# Patient Record
Sex: MALE | Race: WHITE
[De-identification: names, ages, dates, MRNs, and addresses within clinical notes are randomized per-mention and may not be internally consistent; named-entity substitution may affect disease eponyms.]

---

## 2019-07-30 ENCOUNTER — HOSPITAL ENCOUNTER (OUTPATIENT)
Dept: HOSPITAL 61 - KCIC | Age: 59
Discharge: HOME | End: 2019-07-30
Attending: NURSE PRACTITIONER
Payer: COMMERCIAL

## 2019-07-30 DIAGNOSIS — M25.421: Primary | ICD-10-CM

## 2019-07-30 PROCEDURE — 73080 X-RAY EXAM OF ELBOW: CPT

## 2019-07-30 NOTE — [facility]
EXAM: Right elbow, 3 views.

 

HISTORY: Pain. Swelling.

 

COMPARISON: None.

 

FINDINGS: 3 views of the right elbow are obtained. There is no fracture, 

dislocation or subluxation. There is a corticated ossicle adjacent to the 

lateral epicondyles, likely due to the sequela of remote injury. There is 

a joint effusion.

 

IMPRESSION: Right elbow effusion. No displaced fracture is seen 

radiographically.

 

Electronically signed by: Marivel Sarmiento MD (7/30/2019 2:37 PM) [place] [id number]

## 2019-08-28 ENCOUNTER — HOSPITAL ENCOUNTER (OUTPATIENT)
Dept: HOSPITAL 61 - NM | Age: 59
Discharge: HOME | End: 2019-08-28
Attending: INTERNAL MEDICINE
Payer: COMMERCIAL

## 2019-08-28 DIAGNOSIS — I71.2: ICD-10-CM

## 2019-08-28 DIAGNOSIS — I35.0: Primary | ICD-10-CM

## 2019-08-28 DIAGNOSIS — J42: ICD-10-CM

## 2019-08-28 DIAGNOSIS — I10: ICD-10-CM

## 2019-08-28 DIAGNOSIS — E78.00: ICD-10-CM

## 2019-08-28 PROCEDURE — 93306 TTE W/DOPPLER COMPLETE: CPT

## 2019-08-28 PROCEDURE — 71275 CT ANGIOGRAPHY CHEST: CPT

## 2019-08-28 PROCEDURE — 78452 HT MUSCLE IMAGE SPECT MULT: CPT

## 2019-08-28 PROCEDURE — 93017 CV STRESS TEST TRACING ONLY: CPT

## 2019-08-28 PROCEDURE — A9500 TC99M SESTAMIBI: HCPCS

## 2019-08-28 NOTE — CARD
MR#: [id number]

Account#: [id number]

Accession#: [id number]

Date of Study: 08/28/2019

Ordering Physician: OBEY ELLIS, 

Referring Physician: OBEY ELLIS 

Tech: Staci Nixon RDCS





--------------- APPROVED REPORT --------------





EXAM: Two-dimensional and M-mode echocardiogram with Doppler and color Doppler.



INDICATION

Thoracic Aortic Aneurysm



2D DIMENSIONS 

RVDd2.4 (2.9-3.5cm)Left Atrium(2D)2.8 (1.6-4.0cm)

IVSd0.7 (0.7-1.1cm)Aortic Root(2D)2.8 (2.0-3.7cm)

LVDd5.2 (3.9-5.9cm)LVOT Diameter2.0 (1.8-2.4cm)

PWd0.8 (0.7-1.1cm)LVDs3.4 (2.5-4.0cm)

FS (%) 33.9 %SV80.1 ml

LVEF(%)62.4 (>50%)



Aortic Valve

AoV Peak Jagdish.125.0cm/sAoV VTI23.5cm

AO Peak GR.6.3mmHgLVOT Peak Jagdish.132.3cm/s

AO Mean GR.3mmHgAVA (VMAX)3.18cm2

YANN   (VTI)3.60cm2



Mitral Valve

MV E Uzicfuqr19.3cm/sMV DECEL [id number]

MV A Rhxmlkvq40.7cm/sE/A  Ratio1.5



Pulmonary Vein

S1 Xeuohmak72.8cm/sD2 Iceqfowz31.4cm/s



 LEFT VENTRICLE 

The left ventricle is normal size. There is normal left ventricular wall thickness. The left ventricu
lar systolic function is normal and the ejection fraction is within normal range. The Ejection Fracti
on is 60-65%. There is normal LV segmental wall motion. The left ventricular diastolic function and f
illing is normal for age.



 RIGHT VENTRICLE 

The right ventricle is normal size. The right ventricular systolic function is normal.



 ATRIA 

The left atrium size is normal. The right atrium size is normal. The interatrial septum is intact wit
h no evidence for an atrial septal defect or patent foramen ovale as noted on 2-D or Doppler imaging.




 AORTIC VALVE 

The aortic valve is calcified but opens well. Doppler and Color Flow revealed no significant aortic r
egurgitation. There is no significant aortic valvular stenosis.



 MITRAL VALVE 

The mitral valve is normal in structure and function. There is no evidence of mitral valve prolapse. 
There is no mitral valve stenosis. Doppler and Color Flow revealed no mitral valve regurgitation note
d.



 TRICUSPID VALVE 

The tricuspid valve is normal in structure and function. Doppler and Color Flow revealed no tricuspid
 valve regurgitation noted. There is no tricuspid valve stenosis.



 PULMONIC VALVE 

The pulmonic valve is not well visualized. Doppler and Color Flow revealed no pulmonic valvular regur
gitation. There is no pulmonic valvular stenosis.



 GREAT VESSELS 

The aortic root is normal in size. The ascending aorta is mildly dilated at 4.1 cm although not well 
visualized. The IVC is normal in size and collapses >50% with inspiration.



 PERICARDIAL EFFUSION 

There is no evidence of significant pericardial effusion.



Critical Notification

Critical Value: No



<Conclusion>

The left ventricular systolic function is normal and the ejection fraction is within normal range. Th
e Ejection Fraction is 60-65%.

There is normal LV segmental wall motion.

The ascending aorta is mildly dilated at 4.1 cm although not well visualized.



Signed by : Vasiliy Toro, 

Electronically Approved : 08/28/2019 11:04:33

## 2019-08-28 NOTE — RAD
Examination: CT ANGIOGRAPHY CHEST

 

History: Thoracic aortic aneurysm

 

Comparison/Correlation: 6/17/2016 CTA chest with contrast

 

Findings: Axial images of chest were obtained prior to and following IV 

contrast according to aorta arteriography protocol. Delayed postcontrast 

imaging of the chest was also performed. Sagittal and coronal reformatted 

images were provided. MIP images provided. 3-D volume rendered images were

provided.

 

Endoluminal stent material is noted to extend within the aorta just 

proximal to the left common carotid origin to the proximal descending 

thoracic aorta. No migration or kinking of the stent noted. No aortic 

aneurysm. No findings to suggest dissection or intramural hematoma 

although evaluation is limited at the aortic root on postcontrast imaging 

due to motion.

 

No enlarged thoracic lymph nodes.

 

Centrilobular emphysematous involvement of the lung fields noted. No 

pleural or pericardial effusion. No pneumothorax.

 

Mild circumferential wall thickening of the lower lobe bronchi mostly at 

the left lower lobe region compatible with chronic bronchitis.

 

Partially visualized upper abdomen is unremarkable.

 

 

Impression:

No thoracic aortic aneurysm. No findings to suggest dissection although 

evaluation is limited due to motion at the aortic root. Overall morphology

of the thoracic aorta is unchanged.

 

Centrilobular emphysema.

 

 

PQRS Compliance Statement:

 

One or more of the following individualized dose reduction techniques were

utilized for this examination:  

1. Automated exposure control  

2. Adjustment of the mA and/or kV according to patient size  

3. Use of iterative reconstruction technique

 

Electronically signed by: Mitul Agrawal MD (8/28/2019 2:31 PM) [facility]

## 2019-08-28 NOTE — RAD
MR#: [id number]

Account#: [id number]

Accession#: [id number]

Date of Study: 08/28/2019

Ordering Physician: OBEY ELLIS, 

Referring Physician: DARRYL MARTINEZ Tech: JEFF Carter





--------------- APPROVED REPORT --------------





Test Type:          Pharmacological

Stress Nurse/Tech: Minerva Ramos R.N.

Test Indications: Thoracic aortic aneurysm repaired 2012

Cardiac History: high chol, htn

Medications:     see ehr

Medical History: [facility]

Resting ECG:     SR

Resting Heart Rate: 68 bpm

Resting Blood Pressure: 116/68mmHg

Pretest Chest Pain: No chest pain



Nurse/Tech Notes

lungs cta, heart tones regular

Consent: The procedure was explained to the patient in lay terms. Informed consent was witnessed. Jaret
eout was entered into [employer]. History and Stress Test performed by SHERICE Farrell ARRT (R) 
(N)



Pharm. Details

Pharmacologic stress testing was performed using 0.4mg per 5ml of regadenoson given intravenously ove
r 7-10 seconds.



Stress Symptoms

No chest pain or symptoms.



POST EXERCISE

Reason for Termination: Infusion complete

Target HR: No

Max HR: 102 bpm

Max Blood Pressure: 163/85mmHg

Chest Pain: No. 

Arrhythmia: No. 

ST Change: No. 



INTERPRETATION

Stress EKG Conclusion: The resting EKG shows a sinus rhythm with mild nonspecific ST-T wave changes.

The stress EKG shows no significant changes from baseline.

No EKG evidence of stress-induced ischemia.



Imaging Protocol

IMAGE PROTOCOL: Rest Tc-99m/stress Tc-99m 1 day



Rest:            Stress:         Viability:   

Radiopharm.Tc99m YhrykimhhOk84r Sestamibi

Bgos64wOd            33mCi            

Duration    15min.           13min.           

Img Date  08/28/2019 08/28/2019      

Inj-Img [id number].           60min.           



Rest Admin Site:IV - Right AntecubitalAdministrator:SHERICE Farrell ARRT (R)(N)

Stress Admin Site: IV - Right AntecubitalAdministrator: SHERICE Farrell ARRT (R)(N)



STRESS DATA

End Diast. Vol.70.0mlLVEDV index BSA39.0ml

End Syst. Vol.17.0mlLVESV index BSA9.0ml

Myocardial Evig643.0gEject. Zjahsdob94.0%



Stress Scores

Regional WT2.00Summed WT12.00

Regional WM0.00Summed WM3.00



LV Perfusion

The stress scans showed no significant defects.

The rest scans showed no significant defects.

Nuclear imaging shows no reversible ischemia or infarct.



Wall Motion

LV systolic function is normal with no regional wall motion abnormalities and an ejection fraction of
 greater than 70%.



LV Perf. Quant

17 Seg. SSS0.00

17 Seg. SRS3.00

17 Seg. SDS0.00

Stress Defect Extent (% LAD)0.00Rest Defect Extent (% LAD)0.00Rev. Defect Extent (% LAD)0.00

Stress Defect Extent (% LCX) 0.00Rest Defect Extent (% LCX)5.00Rev. Defect Extent (% LCX)0.00

Stress Defect Extent (% RCA)0.00Rest Defect Extent (% RCA)11.10Rev. Defect Extent (% RCA)0.00

Stress Defect Extent (% KEENAN)0.00Rest Defect Extent (% KEENAN)3.70Rev. Defect Extent (% KEENAN)0.00



Conclusion

1. No EKG evidence of stress-induced ischemia.

2. Nuclear imaging shows no reversible ischemia or infarct.

3. Normal left ventricular systolic function with an ejection fraction of greater than 70%.

4. Low risk Lexiscan nuclear stress test.



Signed by : Alexi Cruz MD

Electronically Approved : 08/28/2019 12:56:46

## 2021-03-12 ENCOUNTER — HOSPITAL ENCOUNTER (OUTPATIENT)
Dept: HOSPITAL 61 - ER | Age: 61
Discharge: HOME | End: 2021-03-12
Attending: INTERNAL MEDICINE
Payer: COMMERCIAL

## 2021-03-12 VITALS — SYSTOLIC BLOOD PRESSURE: 128 MMHG | DIASTOLIC BLOOD PRESSURE: 68 MMHG

## 2021-03-12 VITALS — WEIGHT: 149.91 LBS | BODY MASS INDEX: 23.53 KG/M2 | HEIGHT: 67 IN
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
## 2021-03-12 DIAGNOSIS — R13.10: Primary | ICD-10-CM

## 2021-03-12 DIAGNOSIS — I10: ICD-10-CM

## 2021-03-12 DIAGNOSIS — Z72.89: ICD-10-CM

## 2021-03-12 DIAGNOSIS — K29.50: ICD-10-CM

## 2021-03-12 DIAGNOSIS — T18.128A: ICD-10-CM

## 2021-03-12 DIAGNOSIS — Z98.890: ICD-10-CM
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
## 2021-03-12 DIAGNOSIS — K31.89: ICD-10-CM

## 2021-03-12 DIAGNOSIS — Z79.899: ICD-10-CM

## 2021-03-12 DIAGNOSIS — Z87.891: ICD-10-CM

## 2021-03-12 DIAGNOSIS — K20.90: ICD-10-CM

## 2021-03-12 DIAGNOSIS — F32.9: ICD-10-CM

## 2021-03-12 DIAGNOSIS — F41.9: ICD-10-CM
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
## 2021-03-12 DIAGNOSIS — J44.9: ICD-10-CM

## 2021-03-12 LAB
% BANDS: 1 % (ref 0–9)
% LYMPHS: 3 % (ref 24–48)
% SEGS: 96 % (ref 35–66)
ALBUMIN SERPL-MCNC: 4.3 G/DL (ref 3.4–5)
ALBUMIN/GLOB SERPL: 1.5 {RATIO} (ref 1–1.7)
ALP SERPL-CCNC: 52 U/L (ref 46–116)
ALT SERPL-CCNC: 34 U/L (ref 16–63)
ANION GAP SERPL CALC-SCNC: 11 MMOL/L (ref 6–14)
AST SERPL-CCNC: 29 U/L (ref 15–37)
BASOPHILS # BLD AUTO: 0.1 X10^3/UL (ref 0–0.2)
BASOPHILS NFR BLD: 0 % (ref 0–3)
BILIRUB SERPL-MCNC: 1.3 MG/DL (ref 0.2–1)
BUN SERPL-MCNC: 20 MG/DL (ref 8–26)
BUN/CREAT SERPL: 22 (ref 6–20)
CALCIUM SERPL-MCNC: 9.3 MG/DL (ref 8.5–10.1)
CHLORIDE SERPL-SCNC: 103 MMOL/L (ref 98–107)
CO2 SERPL-SCNC: 29 MMOL/L (ref 21–32)
CREAT SERPL-MCNC: 0.9 MG/DL (ref 0.7–1.3)
EOSINOPHIL NFR BLD: 0 % (ref 0–3)
EOSINOPHIL NFR BLD: 0 X10^3/UL (ref 0–0.7)
ERYTHROCYTE [DISTWIDTH] IN BLOOD BY AUTOMATED COUNT: 13.9 % (ref 11.5–14.5)
GFR SERPLBLD BASED ON 1.73 SQ M-ARVRAT: 86.4 ML/MIN
GLUCOSE SERPL-MCNC: 121 MG/DL (ref 70–99)
HCT VFR BLD CALC: 43 % (ref 39–53)
HGB BLD-MCNC: 14.2 G/DL (ref 13–17.5)
LYMPHOCYTES # BLD: 0.5 X10^3/UL (ref 1–4.8)
LYMPHOCYTES NFR BLD AUTO: 4 % (ref 24–48)
MCH RBC QN AUTO: 30 PG (ref 25–35)
MCHC RBC AUTO-ENTMCNC: 33 G/DL (ref 31–37)
MCV RBC AUTO: 89 FL (ref 79–100)
MONO #: 0.2 X10^3/UL (ref 0–1.1)
MONOCYTES NFR BLD: 2 % (ref 0–9)
NEUT #: 11.3 X10^3/UL (ref 1.8–7.7)
NEUTROPHILS NFR BLD AUTO: 94 % (ref 31–73)
PLATELET # BLD AUTO: 347 X10^3/UL (ref 140–400)
PLATELET # BLD EST: ADEQUATE 10*3/UL
POTASSIUM SERPL-SCNC: 4 MMOL/L (ref 3.5–5.1)
PROT SERPL-MCNC: 7.2 G/DL (ref 6.4–8.2)
RBC # BLD AUTO: 4.82 X10^6/UL (ref 4.3–5.7)
SODIUM SERPL-SCNC: 143 MMOL/L (ref 136–145)
WBC # BLD AUTO: 12.1 X10^3/UL (ref 4–11)

## 2021-03-12 PROCEDURE — 96361 HYDRATE IV INFUSION ADD-ON: CPT

## 2021-03-12 PROCEDURE — 85007 BL SMEAR W/DIFF WBC COUNT: CPT

## 2021-03-12 PROCEDURE — U0003 INFECTIOUS AGENT DETECTION BY NUCLEIC ACID (DNA OR RNA); SEVERE ACUTE RESPIRATORY SYNDROME CORONAVIRUS 2 (SARS-COV-2) (CORONAVIRUS DISEASE [COVID-19]), AMPLIFIED PROBE TECHNIQUE, MAKING USE OF HIGH THROUGHPUT TECHNOLOGIES AS DESCRIBED BY CMS-2020-01-R: HCPCS

## 2021-03-12 PROCEDURE — 84484 ASSAY OF TROPONIN QUANT: CPT

## 2021-03-12 PROCEDURE — 96374 THER/PROPH/DIAG INJ IV PUSH: CPT

## 2021-03-12 PROCEDURE — 87426 SARSCOV CORONAVIRUS AG IA: CPT

## 2021-03-12 PROCEDURE — 71260 CT THORAX DX C+: CPT

## 2021-03-12 PROCEDURE — 96375 TX/PRO/DX INJ NEW DRUG ADDON: CPT

## 2021-03-12 PROCEDURE — 85025 COMPLETE CBC W/AUTO DIFF WBC: CPT

## 2021-03-12 PROCEDURE — 36415 COLL VENOUS BLD VENIPUNCTURE: CPT

## 2021-03-12 PROCEDURE — 43247 EGD REMOVE FOREIGN BODY: CPT

## 2021-03-12 PROCEDURE — 96376 TX/PRO/DX INJ SAME DRUG ADON: CPT

## 2021-03-12 PROCEDURE — 80053 COMPREHEN METABOLIC PANEL: CPT

## 2021-03-12 NOTE — RAD
CT THORAX W 



INDICATION:  food bolus impaction 



Comparison: 8/28/2019.



TECHNIQUE: Following the uneventful administration of intravenous contrast, 75 cc Omnipaque 300, axia
l CT sections were obtained through the lungs and upper abdomen. Multiplanar reconstructions and MIP 
images were obtained.



PQRS compliance statement:



One or more of the following individualized dose reduction techniques were utilized for this examinat
ion:

1. Automated exposure control

2. Adjustment of the mA and/or kV according to patient size

3. Use of iterative reconstruction technique



FINDINGS:



Lungs and Airways: No pulmonary mass or consolidation. Centrilobular emphysema. No abnormality of the
 central airways. 



Pleura: The pleural spaces are normal.



Heart and Mediastinum: The visualized thyroid is normal in size and attenuation. No axillary or supra
clavicular lymphadenopathy. No mediastinal, hilar or retrocrural lymphadenopathy. Calcified right hil
ar lymph nodes consistent with remote granulomatous disease. The heart and pericardium are within nor
mal limits. Postsurgical changes of aortic stent graft repair. Distal esophageal wall thickening. Het
erogeneous debris in the distal esophagus just proximal to this area of thickening, with fluid disten
tion of the remainder of the esophagus.



Abdomen: Limited images through the upper abdomen show no abnormality of the visualized organs.



Bones and Soft Tissues: The visualized bones and chest wall soft tissues are within normal limits.



IMPRESSION:  



1. Incompletely characterized distal esophageal wall thickening. While this could represent esophagit
is, direct visualization is recommended to exclude underlying neoplasm.



2. Heterogeneous debris just proximal to this area of thickening consistent with patient's history of
 food bolus impaction. Remainder of the esophagus is mildly distended and fluid-filled.



Electronically signed by: Memo Costello MD (3/12/2021 5:32 AM) [facility]

## 2021-03-12 NOTE — PDOC4
Operative Note


Operative Note


EGD with food bolus extraction


Meds propofol per anesthesia


Pre-op dx dysphagia/meat impaction


post-ox dx s/p food bolus extraction


                esophagitis


                gastritis


Plan resume diet


       release home once breathing/hypoxemia improved


       egd in 2 weeks for dilation


       prilsoec 40 mg daily for 2 months











JESICA SINGH MD             Mar 12, 2021 10:37
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
## 2021-03-12 NOTE — ED.ADGEN
General Adult


EDM:


Chief Complaint:  DIFFICULTY SWALLOWING





HPI:


HPI:





Patient is a 60  year old male coming in for difficulty swallowing.  Patient was

eating pork when he felt like it lodged in his esophagus.  Had tried eating 

oatmeal at the advice of a coworker without improvement.  Patient states he is 

unable to keep fluids down.  Has not had this happen before.  Patient states he 

otherwise has been well





Review of Systems:


Review of Systems:


All other systems within normal limits except for as noted in the HPI





Current Medications:





Current Medications








 Medications


  (Trade)  Dose


 Ordered  Sig/Mina  Start Time


 Stop Time Status Last Admin


Dose Admin


 


 Glucagon


  (Glucagen)  1 mg  1X  ONCE  3/12/21 05:30


 3/12/21 05:31 DC 3/12/21 05:24


1 MG


 


 Info


  (CONTRAST GIVEN


 -- Rx MONITORING)  1 each  PRN DAILY  PRN  3/12/21 05:15


 3/14/21 05:14   





 


 Iohexol


  (Omnipaque 300


 Mg/ml)  75 ml  1X  ONCE  3/12/21 05:30


 3/12/21 05:31 DC 3/12/21 05:16


75 ML


 


 Morphine Sulfate


  (Morphine


 Sulfate)  4 mg  PRN Q2HR  PRN  3/12/21 05:45


 3/13/21 05:44   





 


 Ondansetron HCl


  (Zofran)  4 mg  PRN Q8HRS  PRN  3/12/21 05:45


 3/13/21 05:44   





 


 Sodium Chloride  1,000 ml @ 


 75 mls/hr  [id number]  3/12/21 06:00


 3/13/21 05:59  3/12/21 06:07


75 MLS/HR











Allergies:


Allergies:





Allergies








Coded Allergies Type Severity Reaction Last Updated Verified


 


  No Known Drug Allergies    1/6/15 No











Physical Exam:


PE:


Constitutional: Well developed, well nourished, no acute distress, non-toxic 

appearance. []


HENT: Normocephalic, atraumatic, bilateral external ears normal,  nose normal. 

[]


Eyes: PERRLA, conjunctiva normal, no discharge. [] 


Neck: No rigidity, supple, no stridor. [] 


Cardiovascular: Regular rate and rhythm, brisk cap refill []


Lungs & Thorax: Non labored symmetric respirations, no tachypnea or respiratory 

distress []


Abdomen: Soft, nondistended.


Skin: Warm, dry, no erythema, no rash. [] 


Back: Unremarkable


Extremities: No deformities, range of motion grossly intact, no lower extremity 

edema [] 


Neurologic: Alert and oriented X 3, no focal deficits noted. []


Psychologic: Affect normal, judgement normal, mood normal. []





Current Patient Data:


Labs:





                                Laboratory Tests








Test


 3/12/21


03:59


 


White Blood Count


 12.1 x10^3/uL


(4.0-11.0)  H


 


Red Blood Count


 4.82 x10^6/uL


(4.30-5.70)


 


Hemoglobin


 14.2 g/dL


(13.0-17.5)


 


Hematocrit


 43.0 %


(39.0-53.0)


 


Mean Corpuscular Volume


 89 fL ()





 


Mean Corpuscular Hemoglobin 30 pg (25-35)  


 


Mean Corpuscular Hemoglobin


Concent 33 g/dL


(31-37)


 


Red Cell Distribution Width


 13.9 %


(11.5-14.5)


 


Platelet Count


 347 x10^3/uL


(140-400)


 


Neutrophils (%) (Auto) 94 % (31-73)  H


 


Lymphocytes (%) (Auto) 4 % (24-48)  L


 


Monocytes (%) (Auto) 2 % (0-9)  


 


Eosinophils (%) (Auto) 0 % (0-3)  


 


Basophils (%) (Auto) 0 % (0-3)  


 


Neutrophils # (Auto)


 11.3 x10^3/uL


(1.8-7.7)  H


 


Lymphocytes # (Auto)


 0.5 x10^3/uL


(1.0-4.8)  L


 


Monocytes # (Auto)


 0.2 x10^3/uL


(0.0-1.1)


 


Eosinophils # (Auto)


 0.0 x10^3/uL


(0.0-0.7)


 


Basophils # (Auto)


 0.1 x10^3/uL


(0.0-0.2)


 


Segmented Neutrophils % 96 % (35-66)  H


 


Band Neutrophils % 1 % (0-9)  


 


Lymphocytes % 3 % (24-48)  L


 


Platelet Estimate


 Adequate


(ADEQUATE)


 


Sodium Level


 143 mmol/L


(136-145)


 


Potassium Level


 4.0 mmol/L


(3.5-5.1)


 


Chloride Level


 103 mmol/L


()


 


Carbon Dioxide Level


 29 mmol/L


(21-32)


 


Anion Gap 11 (6-14)  


 


Blood Urea Nitrogen


 20 mg/dL


(8-26)


 


Creatinine


 0.9 mg/dL


(0.7-1.3)


 


Estimated GFR


(Cockcroft-Gault) 86.4  





 


BUN/Creatinine Ratio 22 (6-20)  H


 


Glucose Level


 121 mg/dL


(70-99)  H


 


Calcium Level


 9.3 mg/dL


(8.5-10.1)


 


Total Bilirubin


 1.3 mg/dL


(0.2-1.0)  H


 


Aspartate Amino Transferase


(AST) 29 U/L (15-37)





 


Alanine Aminotransferase (ALT)


 34 U/L (16-63)





 


Alkaline Phosphatase


 52 U/L


()


 


Troponin I Quantitative


 < 0.017 ng/mL


(0.000-0.055)


 


Total Protein


 7.2 g/dL


(6.4-8.2)


 


Albumin


 4.3 g/dL


(3.4-5.0)


 


Albumin/Globulin Ratio 1.5 (1.0-1.7)  





                                Laboratory Tests


3/12/21 03:59








                                Laboratory Tests


3/12/21 03:59








Vital Signs:





                                   Vital Signs








  Date Time  Temp Pulse Resp B/P (MAP) Pulse Ox O2 Delivery O2 Flow Rate FiO2


 


3/12/21 03:33 98.5 96 20 139/64 (89) 96 Room Air  





 98.5       











EKG:


EKG:


[]





Heart Score:


C/O Chest Pain:  N/A


Risk Factors:


Risk Factors:  DM, Current or recent (<one month) smoker, HTN, HLP, family 

history of CAD, obesity.


Risk Scores:


Score 0 - 3:  2.5% MACE over next 6 weeks - Discharge Home


Score 4 - 6:  20.3% MACE over next 6 weeks - Admit for Clinical Observation


Score 7 - 10:  72.7% MACE over next 6 weeks - Early Invasive Strategies





Radiology/Procedures:


Radiology/Procedures:





CT THORAX W 





INDICATION:  food bolus impaction 





Comparison: 8/28/2019.





TECHNIQUE: Following the uneventful administration of intravenous contrast, 75 

cc Omnipaque 300, axial CT sections were obtained through the lungs and upper 

abdomen. Multiplanar reconstructions and MIP images were obtained.





PQRS compliance statement:





One or more of the following individualized dose reduction techniques were 

utilized for this examination:


1. Automated exposure control


2. Adjustment of the mA and/or kV according to patient size


3. Use of iterative reconstruction technique





FINDINGS:





Lungs and Airways: No pulmonary mass or consolidation. Centrilobular emphysema. 

No abnormality of the central airways. 





Pleura: The pleural spaces are normal.





Heart and Mediastinum: The visualized thyroid is normal in size and attenuation.

 No axillary or supraclavicular lymphadenopathy. No mediastinal, hilar or 

retrocrural lymphadenopathy. Calcified right hilar lymph nodes consistent with 

remote granulomatous disease. The heart and pericardium are within normal 

limits. Postsurgical changes of aortic stent graft repair. Distal esophageal 

wall thickening. Heterogeneous debris in the distal esophagus just proximal to 

this area of thickening, with fluid distention of the remainder of the 

esophagus.





Abdomen: Limited images through the upper abdomen show no abnormality of the 

visualized organs.





Bones and Soft Tissues: The visualized bones and chest wall soft tissues are 

within normal limits.





IMPRESSION:  





1. Incompletely characterized distal esophageal wall thickening. While this 

could represent esophagitis, direct visualization is recommended to exclude 

underlying neoplasm.





2. Heterogeneous debris just proximal to this area of thickening consistent with

 patient's history of food bolus impaction. Remainder of the esophagus is mildly

 distended and fluid-filled.





Electronically signed by: Memo Costello MD (3/12/2021 5:32 AM) [facility]





Course & Med Decision Making:


Course & Med Decision Making


Pertinent Labs and Imaging studies reviewed. (See chart for details)


Attempted soda with jumping on heels all times per minute.  Glucagon x2 without 

dislodgment of food bolus.  Will admit for GI evaluation due to CT read 

indicating possible plasm esophageal thickening


[]





Dragon Disclaimer:


Dragon Disclaimer:


This electronic medical record was generated, in whole or in part, using a voice

 recognition dictation system.





Departure


Departure


Impression:  


   Primary Impression:  


   Esophageal obstruction due to food impaction


Disposition:  09 ADMITTED AS INPT THIS HOSP


[facility]


Condition:  STABLE


Referrals:  


HOSEA SOTO MD (PCP)











MAURICIO MCNEILL MD              Mar 12, 2021 03:43

## 2021-03-12 NOTE — PDOC1
History and Physical


Date of Service:


DOS:


DATE: 3/12/21 


TIME: 08:58





Chief Complaint:


Chief Complain:


Difficulty swallowing





History of Present Illness:


HPI:


60  year old male coming in for difficulty swallowing.  Patient was eating pork 

when he felt like it lodged in his esophagus.  Had tried eating oatmeal at the 

advice of a coworker without improvement.  Patient states he is unable to keep 

fluids down.  Has not had this happen before.  Patient states he otherwise has 

been well





Past Medical/Surgical History:


PMH/PSH:


Hypertension, thoracic aortic aneurysm repair, dyslipidemia, COPD, testicular 

surgery





Allergies:


Allergies:  


Coded Allergies:  


     No Known Drug Allergies (Unverified , 3/12/21)





Family History:


Family History:


History of esophageal dilation in the father





Social History:


Social History:


Former smoker, occasional alcohol drinker





Current Medications:


Current Medications





Current Medications


Glucagon (Glucagen) 1 mg 1X  ONCE IV  Last administered on 3/12/21at 04:07;  

Start 3/12/21 at 04:00;  Stop 3/12/21 at 04:01;  Status DC


Ondansetron HCl (Zofran) 4 mg 1X  ONCE IVP  Last administered on 3/12/21at 

04:29;  Start 3/12/21 at 04:00;  Stop 3/12/21 at 04:01;  Status DC


Iohexol (Omnipaque 300 Mg/ml) 75 ml 1X  ONCE IV  Last administered on 3/12/21at 

05:16;  Start 3/12/21 at 05:30;  Stop 3/12/21 at 05:31;  Status DC


Glucagon (Glucagen) 1 mg 1X  ONCE IV  Last administered on 3/12/21at 05:24;  

Start 3/12/21 at 05:30;  Stop 3/12/21 at 05:31;  Status DC


Info (CONTRAST GIVEN -- Rx MONITORING) 1 each PRN DAILY  PRN MC SEE COMMENTS;  

Start 3/12/21 at 05:15;  Stop 3/14/21 at 05:14


Ondansetron HCl (Zofran) 4 mg PRN Q8HRS  PRN IV NAUSEA/VOMITING 1ST CHOICE Last 

administered on 3/12/21at 06:23;  Start 3/12/21 at 05:45;  Stop 3/13/21 at 05:44


Morphine Sulfate (Morphine Sulfate) 4 mg PRN Q2HR  PRN IV SEVERE PAIN 7-10;  

Start 3/12/21 at 05:45;  Stop 3/13/21 at 05:44


Sodium Chloride 1,000 ml @  75 mls/hr [id number] IV  Last administered on 3/12/21at

06:07;  Start 3/12/21 at 06:00;  Stop 3/13/21 at 05:59


Pantoprazole Sodium (PROTONIX VIAL for IV PUSH) 40 mg 1X  ONCE IVP ;  Start 

3/12/21 at 09:00;  Stop 3/12/21 at 09:01





Active Scripts


Active


Reported


Fluticasone Propionate 16 Gm Spray.susp 1 Spray NS DAILY 


Loratadine 10 Mg Tab.rapdis 10 Mg PO  


Atenolol 50 Mg Tablet 50 Mg PO DAILY 


Symbicort 160-4.5 Mcg Inhaler (Budesonide/Formoterol Fumarate) 10.2 Gm 

Hfa.aer.ad 2 Puff IH BID





ROS:


Review of Systems


Review of System


REVIEW OF SYSTEMS:


GENERAL:  Denies weakness


SKIN:  No bruising, hair changes or rashes.


EYES:  No blurred, double or loss of vision.


NOSE AND THROAT:  No history of nosebleeds, hoarseness or sore throat.


HEART:  No history of palpitations, chest pain or shortness of breath on


exertion.


LUNGS:  Denies cough, hemoptysis, wheezing or shortness of breath.


GASTROINTESTINAL:  Denies changes in appetite, nausea, vomiting, diarrhea or


constipation.


GENITOURINARY:  No history of frequency, urgency, hesitancy or nocturia.


NEUROLOGIC:  Denies history of numbness, tingling, or tremor.


PSYCHIATRIC:  No history of panic, anxiety or depression.


ENDOCRINE:  No history of heat or cold intolerance, polyuria or polydipsia.


EXTREMITIES:  Denies joint pain, pain on walking or stiffness.





Physical Exam:


Vital Signs:





Vital Signs








  Date Time  Temp Pulse Resp B/P (MAP) Pulse Ox O2 Delivery O2 Flow Rate FiO2


 


3/12/21 07:14  106  132/80 (97) 93 Room Air  


 


3/12/21 03:33 98.5  20     





 98.5       








Physcial Exam:


GEN:   No apparent distress.  Alert and oriented


HEENT:   Normal cephalic, atraumatic, external auditory canals are patent


EYES:   Extraocular muscles are intact, pupil are equally round and reactive to 

light and accommodation


MUSCULOSKELETAL:  Well developed , well nourished, good range of motion


ENDOCRINE:   No thyromegaly was palpated


LYMPHATICS:   No cervical chain or axillary nodes were noted


HEMATOPOIETIC:  No bruising


NECK:   Supple, no JVD, no thyromegaly was noted


LUNGS:   Clear to auscultation in all lung fields without rhonchi or wheezing


HEART:    RRR, S!, S2 present.  Peripheral pulses intact, no obvious murmurs 

noted


ABDOMEN:   Soft, nontender.  Positive bowel sounds, no organomegaly, normal 

bowel sounds


EXTREMITIES:   Without clubbing, cyanosis, or edema.  Pedal pulses intact.  

Negative Homans sign


NEUROLOGIC:   Normal speech and tone.  A&O x 3, moves all extremities, no 

obvious focal deficits


PSYCHIATRIC:   Normal affect, normal mood. Stable


SKIN:   No ulcerations or rashes, good skin turgor, no jaundice


VASCULAR:   Good capillary refill, neurovascular bundle appears to be intact





Labs:


Labs:





Laboratory Tests








Test


 3/12/21


03:59


 


White Blood Count


 12.1 x10^3/uL


(4.0-11.0)


 


Red Blood Count


 4.82 x10^6/uL


(4.30-5.70)


 


Hemoglobin


 14.2 g/dL


(13.0-17.5)


 


Hematocrit


 43.0 %


(39.0-53.0)


 


Mean Corpuscular Volume 89 fL () 


 


Mean Corpuscular Hemoglobin 30 pg (25-35) 


 


Mean Corpuscular Hemoglobin


Concent 33 g/dL


(31-37)


 


Red Cell Distribution Width


 13.9 %


(11.5-14.5)


 


Platelet Count


 347 x10^3/uL


(140-400)


 


Neutrophils (%) (Auto) 94 % (31-73) 


 


Lymphocytes (%) (Auto) 4 % (24-48) 


 


Monocytes (%) (Auto) 2 % (0-9) 


 


Eosinophils (%) (Auto) 0 % (0-3) 


 


Basophils (%) (Auto) 0 % (0-3) 


 


Neutrophils # (Auto)


 11.3 x10^3/uL


(1.8-7.7)


 


Lymphocytes # (Auto)


 0.5 x10^3/uL


(1.0-4.8)


 


Monocytes # (Auto)


 0.2 x10^3/uL


(0.0-1.1)


 


Eosinophils # (Auto)


 0.0 x10^3/uL


(0.0-0.7)


 


Basophils # (Auto)


 0.1 x10^3/uL


(0.0-0.2)


 


Segmented Neutrophils % 96 % (35-66) 


 


Band Neutrophils % 1 % (0-9) 


 


Lymphocytes % 3 % (24-48) 


 


Platelet Estimate


 Adequate


(ADEQUATE)


 


Sodium Level


 143 mmol/L


(136-145)


 


Potassium Level


 4.0 mmol/L


(3.5-5.1)


 


Chloride Level


 103 mmol/L


()


 


Carbon Dioxide Level


 29 mmol/L


(21-32)


 


Anion Gap 11 (6-14) 


 


Blood Urea Nitrogen


 20 mg/dL


(8-26)


 


Creatinine


 0.9 mg/dL


(0.7-1.3)


 


Estimated GFR


(Cockcroft-Gault) 86.4 





 


BUN/Creatinine Ratio 22 (6-20) 


 


Glucose Level


 121 mg/dL


(70-99)


 


Calcium Level


 9.3 mg/dL


(8.5-10.1)


 


Total Bilirubin


 1.3 mg/dL


(0.2-1.0)


 


Aspartate Amino Transf


(AST/SGOT) 29 U/L (15-37) 





 


Alanine Aminotransferase


(ALT/SGPT) 34 U/L (16-63) 





 


Alkaline Phosphatase


 52 U/L


()


 


Troponin I Quantitative


 < 0.017 ng/mL


(0.000-0.055)


 


Total Protein


 7.2 g/dL


(6.4-8.2)


 


Albumin


 4.3 g/dL


(3.4-5.0)


 


Albumin/Globulin Ratio 1.5 (1.0-1.7) 








Laboratory Tests








Test


 3/12/21


03:59


 


White Blood Count


 12.1 x10^3/uL


(4.0-11.0)


 


Red Blood Count


 4.82 x10^6/uL


(4.30-5.70)


 


Hemoglobin


 14.2 g/dL


(13.0-17.5)


 


Hematocrit


 43.0 %


(39.0-53.0)


 


Mean Corpuscular Volume 89 fL () 


 


Mean Corpuscular Hemoglobin 30 pg (25-35) 


 


Mean Corpuscular Hemoglobin


Concent 33 g/dL


(31-37)


 


Red Cell Distribution Width


 13.9 %


(11.5-14.5)


 


Platelet Count


 347 x10^3/uL


(140-400)


 


Neutrophils (%) (Auto) 94 % (31-73) 


 


Lymphocytes (%) (Auto) 4 % (24-48) 


 


Monocytes (%) (Auto) 2 % (0-9) 


 


Eosinophils (%) (Auto) 0 % (0-3) 


 


Basophils (%) (Auto) 0 % (0-3) 


 


Neutrophils # (Auto)


 11.3 x10^3/uL


(1.8-7.7)


 


Lymphocytes # (Auto)


 0.5 x10^3/uL


(1.0-4.8)


 


Monocytes # (Auto)


 0.2 x10^3/uL


(0.0-1.1)


 


Eosinophils # (Auto)


 0.0 x10^3/uL


(0.0-0.7)


 


Basophils # (Auto)


 0.1 x10^3/uL


(0.0-0.2)


 


Segmented Neutrophils % 96 % (35-66) 


 


Band Neutrophils % 1 % (0-9) 


 


Lymphocytes % 3 % (24-48) 


 


Platelet Estimate


 Adequate


(ADEQUATE)


 


Sodium Level


 143 mmol/L


(136-145)


 


Potassium Level


 4.0 mmol/L


(3.5-5.1)


 


Chloride Level


 103 mmol/L


()


 


Carbon Dioxide Level


 29 mmol/L


(21-32)


 


Anion Gap 11 (6-14) 


 


Blood Urea Nitrogen


 20 mg/dL


(8-26)


 


Creatinine


 0.9 mg/dL


(0.7-1.3)


 


Estimated GFR


(Cockcroft-Gault) 86.4 





 


BUN/Creatinine Ratio 22 (6-20) 


 


Glucose Level


 121 mg/dL


(70-99)


 


Calcium Level


 9.3 mg/dL


(8.5-10.1)


 


Total Bilirubin


 1.3 mg/dL


(0.2-1.0)


 


Aspartate Amino Transf


(AST/SGOT) 29 U/L (15-37) 





 


Alanine Aminotransferase


(ALT/SGPT) 34 U/L (16-63) 





 


Alkaline Phosphatase


 52 U/L


()


 


Troponin I Quantitative


 < 0.017 ng/mL


(0.000-0.055)


 


Total Protein


 7.2 g/dL


(6.4-8.2)


 


Albumin


 4.3 g/dL


(3.4-5.0)


 


Albumin/Globulin Ratio 1.5 (1.0-1.7) 











Images:


Images


CT CHEST





IMPRESSION:  





1. Incompletely characterized distal esophageal wall thickening. While this 

could represent esophagitis, direct visualization is recommended to exclude 

underlying neoplasm.





2. Heterogeneous debris just proximal to this area of thickening consistent with

patient's history of food bolus impaction. Remainder of the esophagus is mildly 

distended and fluid-filled.





Assessment/Plan


Assessment/Plan


Odynophagia


Esophageal food bolus impaction


Reactive leukocytosis








Admit to medicine for further management


GI consult for EGD 


n.p.o. for EGD


Ambulation for DVT prophylaxis


Protonix GI prophylaxis


ADA diet


Full code


Discussed with RN and SW


Disposition patient management as above


Surrogate decision maker is the wife





Justifications for Admission


Other Justification














RENATO BECERRIL MD                    Mar 12, 2021 09:01

## 2021-04-14 ENCOUNTER — HOSPITAL ENCOUNTER (OUTPATIENT)
Dept: HOSPITAL 61 - ENDOS | Age: 61
Discharge: HOME | End: 2021-04-14
Attending: INTERNAL MEDICINE
Payer: COMMERCIAL

## 2021-04-14 VITALS — SYSTOLIC BLOOD PRESSURE: 97 MMHG | DIASTOLIC BLOOD PRESSURE: 56 MMHG

## 2021-04-14 DIAGNOSIS — Z98.890: ICD-10-CM

## 2021-04-14 DIAGNOSIS — I10: ICD-10-CM

## 2021-04-14 DIAGNOSIS — Z79.899: ICD-10-CM

## 2021-04-14 DIAGNOSIS — K22.2: ICD-10-CM

## 2021-04-14 DIAGNOSIS — F32.9: ICD-10-CM

## 2021-04-14 DIAGNOSIS — Z87.891: ICD-10-CM

## 2021-04-14 DIAGNOSIS — F41.9: ICD-10-CM

## 2021-04-14 DIAGNOSIS — E78.5: ICD-10-CM

## 2021-04-14 DIAGNOSIS — K29.50: ICD-10-CM

## 2021-04-14 DIAGNOSIS — Z20.822: ICD-10-CM

## 2021-04-14 DIAGNOSIS — Z79.82: ICD-10-CM

## 2021-04-14 DIAGNOSIS — R13.10: Primary | ICD-10-CM

## 2021-04-14 DIAGNOSIS — J44.9: ICD-10-CM

## 2021-04-14 PROCEDURE — 43450 DILATE ESOPHAGUS 1/MULT PASS: CPT

## 2021-04-14 PROCEDURE — 87426 SARSCOV CORONAVIRUS AG IA: CPT

## 2021-04-14 NOTE — HP
ADMIT DATE:  04/14/2021



REASON:  Dysphagia, history of meat impaction.



HISTORY OF PRESENT ILLNESS:  This is a 60-year-old  male whose past

medical history is significant for hypertension, history of thoracic aortic

aneurysm repair, hyperlipidemia, COPD, seen for interval dilatation.  The

patient had a piece of pork stuck approximately month ago, has been on medical

therapy since, omeprazole 40 mg daily, has minimal difficulties.  He is here for

interval dilatation.



PAST MEDICAL HISTORY:  COPD, hypertension, hyperlipidemia, status post thoracic

aortic aneurysm repair.



ALLERGIES:  None.



MEDICATIONS:  Include Xanax, aspirin, atenolol, budesonide, citalopram,

fluticasone, loratadine, omeprazole, and pravastatin.



SOCIAL HISTORY:  Former smoker, social drinker.



PAST SURGICAL HISTORY:  As stated.



REVIEW OF SYSTEMS:  Per records.



PHYSICAL EXAMINATION:

GENERAL:  Reveals a well-nourished, well-developed  male who is alert,

cooperative, in no acute distress.

VITAL SIGNS:  Temperature 97.6, pulse 66, respiratory rate 20, sats 98%.

LUNGS:  Clear.

CARDIOVASCULAR:  Reveals an S1, S2 without S3, S4 or appreciable murmur.

ABDOMEN:  Soft abdomen, normal bowel sounds, without appreciable

hepatosplenomegaly.



IMPRESSION:  Esophageal stricture with reflux, here for interval dilatation. 

Risks and benefits were previously discussed with patient is willing to proceed

at this time.

 



______________________________

JESICA SINGH MD



DR:  SSP/natasha  JOB#:  [id number] / [id number]

DD:  04/14/2021 07:42  DT:  04/14/2021 10:03